# Patient Record
Sex: FEMALE | Race: WHITE | NOT HISPANIC OR LATINO | Employment: OTHER | URBAN - METROPOLITAN AREA
[De-identification: names, ages, dates, MRNs, and addresses within clinical notes are randomized per-mention and may not be internally consistent; named-entity substitution may affect disease eponyms.]

---

## 2019-08-22 PROBLEM — E78.5 HYPERLIPIDEMIA: Status: ACTIVE | Noted: 2019-06-11

## 2019-08-22 PROBLEM — M47.816 OSTEOARTHRITIS OF LUMBAR SPINE: Status: ACTIVE | Noted: 2019-06-11

## 2019-08-22 PROBLEM — K21.9 GASTROESOPHAGEAL REFLUX DISEASE: Status: ACTIVE | Noted: 2017-02-19

## 2019-08-22 PROBLEM — M45.9 ANKYLOSING SPONDYLITIS (HCC): Status: ACTIVE | Noted: 2019-08-22

## 2019-08-22 PROBLEM — M85.80 OSTEOPENIA: Status: ACTIVE | Noted: 2018-03-18

## 2019-08-22 PROBLEM — M25.559 HIP PAIN: Status: ACTIVE | Noted: 2019-08-22

## 2019-08-22 PROBLEM — D80.1 HYPOGAMMAGLOBULINEMIA (HCC): Status: ACTIVE | Noted: 2019-08-22

## 2019-08-22 PROBLEM — R76.8 HIGH TOTAL SERUM IGM: Status: ACTIVE | Noted: 2019-08-22

## 2019-08-22 PROBLEM — Z86.018 HX OF LIPOMA: Status: ACTIVE | Noted: 2019-08-22

## 2019-08-22 PROBLEM — D83.9 CVID (COMMON VARIABLE IMMUNODEFICIENCY) (HCC): Status: ACTIVE | Noted: 2018-02-02

## 2019-08-22 PROBLEM — J30.2 SEASONAL ALLERGIES: Status: ACTIVE | Noted: 2019-08-22

## 2019-10-04 ENCOUNTER — TRANSCRIBE ORDERS (OUTPATIENT)
Dept: ADMINISTRATIVE | Facility: HOSPITAL | Age: 64
End: 2019-10-04

## 2019-10-04 DIAGNOSIS — K29.70 GASTRITIS, PRESENCE OF BLEEDING UNSPECIFIED, UNSPECIFIED CHRONICITY, UNSPECIFIED GASTRITIS TYPE: ICD-10-CM

## 2019-10-04 DIAGNOSIS — B27.00 GAMMAHERPESVIRAL MONONUCLEOSIS WITHOUT COMPLICATION: ICD-10-CM

## 2019-10-04 DIAGNOSIS — D47.2 GAMMOPATHY, MONOCLONAL: Primary | ICD-10-CM

## 2019-10-17 ENCOUNTER — HOSPITAL ENCOUNTER (OUTPATIENT)
Dept: ULTRASOUND IMAGING | Facility: HOSPITAL | Age: 64
Discharge: HOME | End: 2019-10-17
Payer: MEDICARE

## 2019-10-17 DIAGNOSIS — K29.70 GASTRITIS, PRESENCE OF BLEEDING UNSPECIFIED, UNSPECIFIED CHRONICITY, UNSPECIFIED GASTRITIS TYPE: ICD-10-CM

## 2019-10-17 DIAGNOSIS — D47.2 GAMMOPATHY, MONOCLONAL: ICD-10-CM

## 2019-10-17 PROBLEM — R06.2 WHEEZING: Status: ACTIVE | Noted: 2019-10-17

## 2019-10-17 PROBLEM — J31.0 CHRONIC RHINITIS: Status: ACTIVE | Noted: 2019-10-17

## 2019-10-17 PROCEDURE — 76700 US EXAM ABDOM COMPLETE: CPT

## 2019-10-17 PROCEDURE — 76830 TRANSVAGINAL US NON-OB: CPT

## 2019-10-17 PROCEDURE — 76856 US EXAM PELVIC COMPLETE: CPT

## 2019-10-20 PROBLEM — D47.2 MONOCLONAL GAMMOPATHY: Status: ACTIVE | Noted: 2019-10-20

## 2019-11-11 PROBLEM — R22.42 MASS OF LEFT THIGH: Status: ACTIVE | Noted: 2019-11-11

## 2019-11-11 PROCEDURE — 88304 TISSUE EXAM BY PATHOLOGIST: CPT | Performed by: PATHOLOGY

## 2019-12-16 PROBLEM — J30.0 VASOMOTOR RHINITIS: Status: ACTIVE | Noted: 2019-12-16

## 2020-02-27 ENCOUNTER — TRANSCRIBE ORDERS (OUTPATIENT)
Dept: ADMINISTRATIVE | Facility: HOSPITAL | Age: 65
End: 2020-02-27

## 2020-03-03 PROBLEM — J32.2 ETHMOID SINUSITIS: Status: ACTIVE | Noted: 2020-03-03

## 2020-03-03 PROCEDURE — 87070 CULTURE OTHR SPECIMN AEROBIC: CPT | Performed by: PHYSICIAN ASSISTANT

## 2020-03-03 PROCEDURE — 87077 CULTURE AEROBIC IDENTIFY: CPT | Performed by: PHYSICIAN ASSISTANT

## 2020-03-03 PROCEDURE — 87205 SMEAR GRAM STAIN: CPT | Performed by: PHYSICIAN ASSISTANT

## 2020-03-03 PROCEDURE — 87102 FUNGUS ISOLATION CULTURE: CPT | Performed by: PHYSICIAN ASSISTANT

## 2020-03-03 PROCEDURE — 87184 SC STD DISK METHOD PER PLATE: CPT | Performed by: PHYSICIAN ASSISTANT

## 2020-04-09 PROBLEM — J33.9 NASAL POLYPS: Status: ACTIVE | Noted: 2020-04-09

## 2020-09-28 ENCOUNTER — TRANSCRIBE ORDERS (OUTPATIENT)
Dept: ADMINISTRATIVE | Facility: HOSPITAL | Age: 65
End: 2020-09-28

## 2020-09-28 DIAGNOSIS — M25.511 PAIN IN RIGHT SHOULDER: Primary | ICD-10-CM

## 2020-10-07 ENCOUNTER — HOSPITAL ENCOUNTER (OUTPATIENT)
Dept: MRI IMAGING | Facility: HOSPITAL | Age: 65
Discharge: HOME | End: 2020-10-07
Payer: MEDICARE

## 2020-10-07 DIAGNOSIS — M25.511 PAIN IN RIGHT SHOULDER: ICD-10-CM

## 2020-10-07 PROCEDURE — G1004 CDSM NDSC: HCPCS

## 2020-10-07 PROCEDURE — 73221 MRI JOINT UPR EXTREM W/O DYE: CPT

## 2021-02-22 ENCOUNTER — PREP FOR PROCEDURE (OUTPATIENT)
Dept: GASTROENTEROLOGY | Facility: CLINIC | Age: 66
End: 2021-02-22

## 2021-02-22 DIAGNOSIS — K21.9 GASTROESOPHAGEAL REFLUX DISEASE WITHOUT ESOPHAGITIS: Primary | ICD-10-CM

## 2021-03-01 RX ORDER — CYCLOBENZAPRINE HCL 5 MG
5 TABLET ORAL 3 TIMES DAILY PRN
COMMUNITY

## 2021-03-01 RX ORDER — OMEGA-3S/DHA/EPA/FISH OIL/D3 300MG-1000
400 CAPSULE ORAL DAILY
COMMUNITY

## 2021-03-01 RX ORDER — MAGNESIUM 30 MG
30 TABLET ORAL 2 TIMES DAILY
COMMUNITY

## 2021-03-01 RX ORDER — SECUKINUMAB 150 MG/ML
INJECTION SUBCUTANEOUS
COMMUNITY

## 2021-03-04 ENCOUNTER — ANESTHESIA EVENT (OUTPATIENT)
Dept: GASTROENTEROLOGY | Facility: AMBULATORY SURGERY CENTER | Age: 66
End: 2021-03-04

## 2021-03-04 ENCOUNTER — ANESTHESIA (OUTPATIENT)
Dept: GASTROENTEROLOGY | Facility: AMBULATORY SURGERY CENTER | Age: 66
End: 2021-03-04

## 2021-03-04 ENCOUNTER — HOSPITAL ENCOUNTER (OUTPATIENT)
Dept: GASTROENTEROLOGY | Facility: AMBULATORY SURGERY CENTER | Age: 66
Discharge: HOME | End: 2021-03-04
Payer: MEDICARE

## 2021-03-04 VITALS
DIASTOLIC BLOOD PRESSURE: 69 MMHG | WEIGHT: 147 LBS | BODY MASS INDEX: 25.1 KG/M2 | HEIGHT: 64 IN | OXYGEN SATURATION: 98 % | RESPIRATION RATE: 18 BRPM | SYSTOLIC BLOOD PRESSURE: 109 MMHG | TEMPERATURE: 96.4 F | HEART RATE: 78 BPM

## 2021-03-04 DIAGNOSIS — K21.9 GASTROESOPHAGEAL REFLUX DISEASE WITHOUT ESOPHAGITIS: ICD-10-CM

## 2021-03-04 PROCEDURE — 88305 TISSUE EXAM BY PATHOLOGIST: CPT | Performed by: PATHOLOGY

## 2021-03-04 PROCEDURE — 00731 ANES UPR GI NDSC PX NOS: CPT | Performed by: NURSE ANESTHETIST, CERTIFIED REGISTERED

## 2021-03-04 PROCEDURE — 43239 EGD BIOPSY SINGLE/MULTIPLE: CPT | Performed by: INTERNAL MEDICINE

## 2021-03-04 RX ORDER — SODIUM CHLORIDE 9 MG/ML
30 INJECTION, SOLUTION INTRAVENOUS CONTINUOUS
Status: DISCONTINUED | OUTPATIENT
Start: 2021-03-04 | End: 2021-03-08 | Stop reason: HOSPADM

## 2021-03-04 RX ORDER — SODIUM CHLORIDE 9 MG/ML
INJECTION, SOLUTION INTRAVENOUS CONTINUOUS PRN
Status: DISCONTINUED | OUTPATIENT
Start: 2021-03-04 | End: 2021-03-04

## 2021-03-04 RX ORDER — PANTOPRAZOLE SODIUM 40 MG/1
40 TABLET, DELAYED RELEASE ORAL DAILY
Qty: 30 TABLET | Refills: 1 | Status: SHIPPED | OUTPATIENT
Start: 2021-03-04

## 2021-03-04 RX ORDER — SODIUM CHLORIDE 9 MG/ML
20 INJECTION, SOLUTION INTRAVENOUS CONTINUOUS
Status: DISCONTINUED | OUTPATIENT
Start: 2021-03-04 | End: 2021-03-08 | Stop reason: HOSPADM

## 2021-03-04 RX ORDER — LIDOCAINE HYDROCHLORIDE 10 MG/ML
INJECTION, SOLUTION EPIDURAL; INFILTRATION; INTRACAUDAL; PERINEURAL AS NEEDED
Status: DISCONTINUED | OUTPATIENT
Start: 2021-03-04 | End: 2021-03-04

## 2021-03-04 RX ORDER — PROPOFOL 10 MG/ML
INJECTION, EMULSION INTRAVENOUS AS NEEDED
Status: DISCONTINUED | OUTPATIENT
Start: 2021-03-04 | End: 2021-03-04

## 2021-03-04 RX ADMIN — LIDOCAINE HYDROCHLORIDE 50 MG: 10 INJECTION, SOLUTION EPIDURAL; INFILTRATION; INTRACAUDAL; PERINEURAL at 11:20

## 2021-03-04 RX ADMIN — SODIUM CHLORIDE: 9 INJECTION, SOLUTION INTRAVENOUS at 10:38

## 2021-03-04 RX ADMIN — PROPOFOL 200 MG: 10 INJECTION, EMULSION INTRAVENOUS at 11:21

## 2021-03-04 NOTE — DISCHARGE INSTRUCTIONS
Gastroesophageal Reflux Disease   WHAT YOU NEED TO KNOW:   Gastroesophageal reflux disease (GERD) is reflux that occurs more than twice a week for a few weeks  Reflux means acid and food in the stomach back up into the esophagus  It usually causes heartburn and other symptoms  GERD can cause other health problems over time if it is not treated  DISCHARGE INSTRUCTIONS:   Call your local emergency number (911 in the [address] [facility]) if:   · You have severe chest pain and sudden trouble breathing  Seek care immediately if:   · You have trouble breathing after you vomit  · You have trouble swallowing, or pain with swallowing  · Your bowel movements are black, bloody, or tarry-looking  · Your vomit looks like coffee grounds or has blood in it  Call your doctor or gastroenterologist if:   · You feel full and cannot burp or vomit  · You vomit large amounts, or you vomit often  · You are losing weight without trying  · Your symptoms get worse or do not improve with treatment  · You have questions or concerns about your condition or care  Medicines:   · Medicines  are used to decrease stomach acid  Medicine may also be used to help your lower esophageal sphincter and stomach contract (tighten) more  · Take your medicine as directed  Contact your healthcare provider if you think your medicine is not helping or if you have side effects  Tell him or her if you are allergic to any medicine  Keep a list of the medicines, vitamins, and herbs you take  Include the amounts, and when and why you take them  Bring the list or the pill bottles to follow-up visits  Carry your medicine list with you in case of an emergency  Manage GERD:   · Do not have foods or drinks that may increase heartburn  These include chocolate, peppermint, fried or fatty foods, drinks that contain caffeine, or carbonated drinks (soda)  Other foods include spicy foods, onions, tomatoes, and tomato-based foods   Do not have foods or drinks that can irritate your esophagus, such as citrus fruits, juices, and alcohol  · Do not eat large meals  When you eat a lot of food at one time, your stomach needs more acid to digest it  Eat 6 small meals each day instead of 3 large ones, and eat slowly  Do not eat meals 2 to 3 hours before bedtime  · Elevate the head of your bed  Place 6-inch blocks under the head of your bed frame  You may also use more than one pillow under your head and shoulders while you sleep  · Maintain a healthy weight  If you are overweight, weight loss may help relieve symptoms of GERD  · Do not smoke  Smoking weakens the lower esophageal sphincter and increases the risk of GERD  Ask your healthcare provider for information if you currently smoke and need help to quit  E-cigarettes or smokeless tobacco still contain nicotine  Talk to your healthcare provider before you use these products  · Do not wear clothing that is tight around your waist   Tight clothing can put pressure on your stomach and cause or worsen GERD symptoms  Follow up with your doctor or gastroenterologist as directed:  Write down your questions so you remember to ask them during your visits  © Copyright [address] Information is for End User's use only and may not be sold, redistributed or otherwise used for commercial purposes  All illustrations and images included in CareNotes® are the copyrighted property of [employer]  or [facility] Sahra Desai  The above information is an  only  It is not intended as medical advice for individual conditions or treatments  Talk to your doctor, nurse or pharmacist before following any medical regimen to see if it is safe and effective for you

## 2021-03-04 NOTE — ANESTHESIA POSTPROCEDURE EVALUATION
Post-Op Assessment Note    CV Status:  Stable  Pain Score: 0    Pain management: adequate     Mental Status:  Alert   Hydration Status:  Stable   PONV Controlled:  None   Airway Patency:  Patent      Post Op Vitals Reviewed: Yes      Staff: CRNA   Comments: vss        No complications documented      /68 (03/04/21 1128)    Temp     Pulse 84 (03/04/21 1128)   Resp 18 (03/04/21 1128)    SpO2 100 % (03/04/21 1128)

## 2021-03-04 NOTE — ANESTHESIA PREPROCEDURE EVALUATION
Procedure:  EGD    Relevant Problems   CARDIO   (+) Hyperlipidemia      GI/HEPATIC   (+) Gastroesophageal reflux disease      MUSCULOSKELETAL   (+) Ankylosing spondylitis (HCC)   (+) Low back pain   (+) Osteoarthritis of lumbar spine      NEURO/PSYCH   (+) Hx of lipoma        Physical Exam    Airway    Mallampati score: I  TM Distance: >3 FB  Neck ROM: full     Dental       Cardiovascular  Rhythm: regular, Rate: normal,     Pulmonary  Breath sounds clear to auscultation,     Other Findings        Anesthesia Plan  ASA Score- 2     Anesthesia Type- IV sedation with anesthesia with ASA Monitors  Additional Monitors:   Airway Plan:           Plan Factors-Exercise tolerance (METS): >4 METS  Chart reviewed  EKG reviewed  Imaging results reviewed  Existing labs reviewed  Patient summary reviewed  Induction- intravenous  Postoperative Plan-     Informed Consent- Anesthetic plan and risks discussed with patient

## 2021-03-22 DIAGNOSIS — K83.8 COMMON BILE DUCT DILATATION: Primary | ICD-10-CM

## 2021-03-22 DIAGNOSIS — R93.89 ABNORMAL FINDING ON IMAGING: ICD-10-CM

## 2021-03-22 DIAGNOSIS — Z01.818 PRE-OP TESTING: ICD-10-CM

## 2021-03-29 ENCOUNTER — TRANSCRIBE ORDERS (OUTPATIENT)
Dept: ADMINISTRATIVE | Facility: HOSPITAL | Age: 66
End: 2021-03-29

## 2021-03-29 ENCOUNTER — TRANSCRIBE ORDERS (OUTPATIENT)
Dept: LAB | Facility: CLINIC | Age: 66
End: 2021-03-29

## 2021-03-29 ENCOUNTER — TRANSCRIBE ORDERS (OUTPATIENT)
Dept: RADIOLOGY | Facility: HOSPITAL | Age: 66
End: 2021-03-29

## 2021-03-29 ENCOUNTER — HOSPITAL ENCOUNTER (OUTPATIENT)
Dept: RADIOLOGY | Facility: HOSPITAL | Age: 66
Discharge: HOME | End: 2021-03-29
Payer: MEDICARE

## 2021-03-29 ENCOUNTER — APPOINTMENT (OUTPATIENT)
Dept: LAB | Facility: CLINIC | Age: 66
End: 2021-03-29
Payer: MEDICARE

## 2021-03-29 DIAGNOSIS — M54.6 PAIN IN THORACIC SPINE: Primary | ICD-10-CM

## 2021-03-29 DIAGNOSIS — Z01.818 PRE-OP TESTING: ICD-10-CM

## 2021-03-29 DIAGNOSIS — K83.8 COMMON BILE DUCT DILATATION: ICD-10-CM

## 2021-03-29 DIAGNOSIS — M45.0 ANKYLOSING SPONDYLITIS OF MULTIPLE SITES IN SPINE (HCC): Primary | ICD-10-CM

## 2021-03-29 DIAGNOSIS — R93.89 ABNORMAL FINDING ON IMAGING: ICD-10-CM

## 2021-03-29 DIAGNOSIS — M54.6 PAIN IN THORACIC SPINE: ICD-10-CM

## 2021-03-29 DIAGNOSIS — M45.0 ANKYLOSING SPONDYLITIS OF MULTIPLE SITES IN SPINE (HCC): ICD-10-CM

## 2021-03-29 LAB
ALBUMIN SERPL BCP-MCNC: 3.6 G/DL (ref 3.5–5)
ALP SERPL-CCNC: 87 U/L (ref 46–116)
ALT SERPL W P-5'-P-CCNC: 47 U/L (ref 12–78)
ANION GAP SERPL CALCULATED.3IONS-SCNC: 7 MMOL/L (ref 4–13)
AST SERPL W P-5'-P-CCNC: 25 U/L (ref 5–45)
BILIRUB SERPL-MCNC: 0.26 MG/DL (ref 0.2–1)
BUN SERPL-MCNC: 13 MG/DL (ref 5–25)
CALCIUM SERPL-MCNC: 8.9 MG/DL (ref 8.3–10.1)
CHLORIDE SERPL-SCNC: 106 MMOL/L (ref 100–108)
CO2 SERPL-SCNC: 29 MMOL/L (ref 21–32)
CREAT SERPL-MCNC: 0.7 MG/DL (ref 0.6–1.3)
CRP SERPL QL: <3 MG/L
ERYTHROCYTE [SEDIMENTATION RATE] IN BLOOD: 16 MM/HOUR (ref 0–29)
GFR SERPL CREATININE-BSD FRML MDRD: 90 ML/MIN/1.73SQ M
GLUCOSE SERPL-MCNC: 94 MG/DL (ref 65–140)
POTASSIUM SERPL-SCNC: 4.1 MMOL/L (ref 3.5–5.3)
PROT SERPL-MCNC: 7 G/DL (ref 6.4–8.2)
SODIUM SERPL-SCNC: 142 MMOL/L (ref 136–145)

## 2021-03-29 PROCEDURE — 80053 COMPREHEN METABOLIC PANEL: CPT

## 2021-03-29 PROCEDURE — 85652 RBC SED RATE AUTOMATED: CPT

## 2021-03-29 PROCEDURE — 86140 C-REACTIVE PROTEIN: CPT

## 2021-03-29 PROCEDURE — 36415 COLL VENOUS BLD VENIPUNCTURE: CPT

## 2021-03-29 PROCEDURE — 72072 X-RAY EXAM THORAC SPINE 3VWS: CPT

## 2021-04-11 ENCOUNTER — HOSPITAL ENCOUNTER (OUTPATIENT)
Dept: MRI IMAGING | Facility: HOSPITAL | Age: 66
Discharge: HOME | End: 2021-04-11
Attending: INTERNAL MEDICINE
Payer: MEDICARE

## 2021-04-11 DIAGNOSIS — K83.8 COMMON BILE DUCT DILATATION: ICD-10-CM

## 2021-04-11 DIAGNOSIS — R93.89 ABNORMAL FINDING ON IMAGING: ICD-10-CM

## 2021-04-11 PROCEDURE — A9585 GADOBUTROL INJECTION: HCPCS | Performed by: INTERNAL MEDICINE

## 2021-04-11 PROCEDURE — 74183 MRI ABD W/O CNTR FLWD CNTR: CPT

## 2021-04-11 PROCEDURE — G1004 CDSM NDSC: HCPCS

## 2021-04-11 PROCEDURE — 76377 3D RENDER W/INTRP POSTPROCES: CPT

## 2021-04-11 RX ADMIN — GADOBUTROL 6 ML: 604.72 INJECTION INTRAVENOUS at 16:51

## 2021-04-15 ENCOUNTER — TELEPHONE (OUTPATIENT)
Dept: MRI IMAGING | Facility: HOSPITAL | Age: 66
End: 2021-04-15

## 2021-04-16 ENCOUNTER — OFFICE VISIT (OUTPATIENT)
Dept: GASTROENTEROLOGY | Facility: CLINIC | Age: 66
End: 2021-04-16
Payer: MEDICARE

## 2021-04-16 VITALS
HEART RATE: 86 BPM | WEIGHT: 150 LBS | BODY MASS INDEX: 25.61 KG/M2 | HEIGHT: 64 IN | SYSTOLIC BLOOD PRESSURE: 113 MMHG | DIASTOLIC BLOOD PRESSURE: 73 MMHG

## 2021-04-16 DIAGNOSIS — D12.6 COLON ADENOMA: ICD-10-CM

## 2021-04-16 DIAGNOSIS — K21.9 GASTROESOPHAGEAL REFLUX DISEASE, UNSPECIFIED WHETHER ESOPHAGITIS PRESENT: Primary | ICD-10-CM

## 2021-04-16 DIAGNOSIS — K83.8 DILATED CBD, ACQUIRED: ICD-10-CM

## 2021-04-16 PROCEDURE — 99214 OFFICE O/P EST MOD 30 MIN: CPT | Performed by: INTERNAL MEDICINE

## 2021-04-16 PROCEDURE — 1123F ACP DISCUSS/DSCN MKR DOCD: CPT | Performed by: INTERNAL MEDICINE

## 2021-04-16 NOTE — PROGRESS NOTES
[facility] - Outpatient Follow-up Note  Kady Mercado June Juan Manuel Monarch 78 y o  female MRN: [id number]  Encounter: [id number]          ASSESSMENT AND PLAN:      1  Gastroesophageal reflux disease, unspecified whether esophagitis present   continue Protonix every other day for the next month or so and then she will go off and take as needed  Patient can resume if symptoms recur  Fundoplication wrap was intact with no recurrence of hiatal hernia on recent EGD    2  Colon adenoma   colonoscopy is due in 2023 for history of colon polyps and she does give family history of in "bowel cancer'    3  Dilated CBD   patient with slightly dilated CBD on ultrasound and we are awaiting MRCP results which are still pending  No evidence of gallstones noted on ultrasound and LFTs were normal     We will see patient in 3 months for follow-up and patient was seen examined with Dr Kyle Jaimes  ______________________________________________________________________    SUBJECTIVE:     this is a 48-year-old female who presents today for follow-up to EGD as well as MRI MRCP  Patient had ultrasound done  At [facility] which showed a slightly dilated common bile duct at 7 mm  LFTs were done yesterday which were normal   Patient describes a slight stomach ache which is about a 1/10 when it seems to be a burning  She states that she is taking pantoprazole every other day  She states that she is having more reflux in the recent past that she has gained about 25 lb  Recent EGD with biopsy showed intact  Fundoplication wrap with minimal reflux esophagitis and mild erythema of the antrum but biopsies were negative  Colonoscopy  Is due in January of 2023 for history of polyps  REVIEW OF SYSTEMS IS OTHERWISE NEGATIVE        Historical Information   Past Medical History:   Diagnosis Date    Ankylosing spondylitis ([address] )     Cataract     Deviated septum     Fever and chills     Gastritis     w/ ulcer    GERD (gastroesophageal reflux disease)     Hives     after hyqvia    Parathyroid abnormality (HCC)     nodules    SVT (supraventricular tachycardia) (HCC)     PRN    Weight loss      Past Surgical History:   Procedure Laterality Date    BILATERAL OOPHORECTOMY      CATARACT EXTRACTION, BILATERAL      COLONOSCOPY      EGD      HAND TENDON SURGERY      L/R wrist     HYSTERECTOMY      with bso    SINUS SURGERY      WISDOM TOOTH EXTRACTION       Social History   Social History     Substance and Sexual Activity   Alcohol Use Yes    Frequency: Monthly or less    Drinks per session: 1 or 2    Binge frequency: Never    Comment: socially      Social History     Substance and Sexual Activity   Drug Use Never     Social History     Tobacco Use   Smoking Status Former Smoker    Packs/day: 0 50    Years: 5 00    Pack years: 2 50    Types: Cigarettes    Quit date: 35    Years since quittin 3   Smokeless Tobacco Never Used     Family History   Problem Relation Age of Onset    Alzheimer's disease Mother     Hypertension Mother     COPD Mother     Hyperlipidemia Mother     Seizures Mother         eplepsy     Arthritis Father         cervical     Obesity Sister     Hypertension Sister     Other Sister         wheezing     Hyperlipidemia Sister     Hypertension Brother     Hyperlipidemia Brother     Psychiatric Illness Brother     Other Brother         boating accident     Other Brother         boating accident     Allergies Son     No Known Problems Daughter     No Known Problems Daughter        Meds/Allergies       Current Outpatient Medications:     acetaminophen (TYLENOL) 500 mg tablet    Biotin 5 MG CAPS    budesonide (PULMICORT) 0 25 mg/2 mL nebulizer solution    calcium carbonate (OS-COLBY) 350 MG tablet    Certolizumab Pegol (CIMZIA SC)    cholecalciferol (VITAMIN D3) 400 units tablet    cyclobenzaprine (FLEXERIL) 5 mg tablet    Fluticasone Propionate (Xhance) 93 MCG/ACT EXHU   levocetirizine (XYZAL) 5 MG tablet    magnesium 30 MG tablet    Multiple Vitamin (MULTI-VITAMIN DAILY PO)    mupirocin (BACTROBAN) 2 % nasal ointment    pantoprazole (PROTONIX) 40 mg tablet    predniSONE 10 mg tablet    Secukinumab (Cosentyx) 150 MG/ML SOSY    valACYclovir (VALTREX) 500 mg tablet    verapamil (VERELAN PM) 120 MG 24 hr capsule    zinc gluconate 50 mg tablet    No Known Allergies        Objective     Blood pressure 113/73, pulse 86, height 5' 4" (1 626 m), weight 68 kg (150 lb)  Body mass index is 25 75 kg/m²  PHYSICAL EXAM:      General Appearance:   Alert, cooperative, no distress   HEENT:   Normocephalic, atraumatic, anicteric      Neck:  Supple, symmetrical, trachea midline   Lungs:   Clear to auscultation bilaterally; no rales, rhonchi or wheezing; respirations unlabored    Heart[de-identified]   Regular rate and rhythm; no murmur, rub, or gallop  Abdomen:   Soft, non-tender, non-distended; normal bowel sounds; no masses, no organomegaly    Genitalia:   Deferred    Rectal:   Deferred    Extremities:  No cyanosis, clubbing or edema    Pulses:  2+ and symmetric    Skin:  No jaundice, rashes, or lesions    Lymph nodes:  No palpable cervical lymphadenopathy        Lab Results:   No visits with results within 1 Day(s) from this visit     Latest known visit with results is:   Appointment on 03/29/2021   Component Date Value    Sodium 03/29/2021 142     Potassium 03/29/2021 4 1     Chloride 03/29/2021 106     CO2 03/29/2021 29     ANION GAP 03/29/2021 7     BUN 03/29/2021 13     Creatinine 03/29/2021 0 70     Glucose 03/29/2021 94     Calcium 03/29/2021 8 9     AST 03/29/2021 25     ALT 03/29/2021 47     Alkaline Phosphatase 03/29/2021 87     Total Protein 03/29/2021 7 0     Albumin 03/29/2021 3 6     Total Bilirubin 03/29/2021 0 26     eGFR 03/29/2021 90     CRP 03/29/2021 <3 0     Sed Rate 03/29/2021 16          Radiology Results:   Xr Spine Thoracic 3 Vw    Result Date: 4/3/2021  Narrative: THORACIC SPINE INDICATION:   M54 6: Pain in thoracic spine  Mid back pain beginning last week  COMPARISON:  None VIEWS:  XR SPINE THORACIC 3 VW FINDINGS: There is no fracture or pathologic bone lesion  Patient has S-shaped thoracic scoliosis with the superior curve centered at the T3 level  The inferior aspect is convex to the right centered at T9-T10   degenerative changes are noted at the T9-T10 level  There is no displacement of the paraspinal line  The pedicles appear intact  Impression: No acute osseous abnormality within the thoracic spine  Degenerative changes T9-T10 level Workstation performed:       EGD-  IMPRESSION:  1  Status post transoral incision less fundoplication, fundoplication wrap appeared intact, no recurrence of hiatal hernia  2  Minimal reflux esophagitis  3  Mild erythema in the antrum  4  Normal duodenum     RECOMMENDATION:  1  Anti-reflux diet  2  Follow-up biopsies  3  Protonix 40 mg as needed    Biopsies-   A  Stomach, cold bx antrum check for hpy:       - Antral mucosa with chronic inactive gastritis  - No Helicobacter pylori organisms are identified on the routine stain        - No intestinal metaplasia, dysplasia or malignancy is identified      B  Esophagus, cold bx lower esophagus reflux:       - Squamous mucosa with fewer than 2 eosinophils per high power field  - Benign glandular mucosa  - No intestinal metaplasia, dysplasia or malignancy is identified

## 2021-04-18 ENCOUNTER — HOSPITAL ENCOUNTER (OUTPATIENT)
Dept: MRI IMAGING | Facility: HOSPITAL | Age: 66
Discharge: HOME | End: 2021-04-18
Payer: MEDICARE

## 2021-04-18 DIAGNOSIS — M54.6 PAIN IN THORACIC SPINE: ICD-10-CM

## 2021-04-18 PROCEDURE — 72146 MRI CHEST SPINE W/O DYE: CPT

## 2021-04-18 PROCEDURE — G1004 CDSM NDSC: HCPCS

## 2021-04-27 ENCOUNTER — HOSPITAL ENCOUNTER (OUTPATIENT)
Dept: MRI IMAGING | Facility: HOSPITAL | Age: 66
Discharge: HOME | End: 2021-04-27
Payer: MEDICARE

## 2021-04-27 DIAGNOSIS — H93.13 NEW ONSET TINNITUS OF BOTH EARS: ICD-10-CM

## 2021-04-27 DIAGNOSIS — H93.13 TINNITUS, BILATERAL: ICD-10-CM

## 2021-04-27 DIAGNOSIS — H91.93 HIGH FREQUENCY HEARING LOSS OF BOTH EARS: ICD-10-CM

## 2021-04-27 PROCEDURE — G1004 CDSM NDSC: HCPCS

## 2021-04-27 PROCEDURE — 70553 MRI BRAIN STEM W/O & W/DYE: CPT

## 2021-04-27 PROCEDURE — A9585 GADOBUTROL INJECTION: HCPCS | Performed by: PHYSICIAN ASSISTANT

## 2021-04-27 RX ADMIN — GADOBUTROL 6 ML: 604.72 INJECTION INTRAVENOUS at 08:34

## 2021-05-13 ENCOUNTER — HOSPITAL ENCOUNTER (OUTPATIENT)
Dept: RADIOLOGY | Facility: HOSPITAL | Age: 66
Discharge: HOME | End: 2021-05-13
Payer: MEDICARE

## 2021-05-13 ENCOUNTER — TRANSCRIBE ORDERS (OUTPATIENT)
Dept: ADMINISTRATIVE | Facility: HOSPITAL | Age: 66
End: 2021-05-13

## 2021-05-13 DIAGNOSIS — M54.2 NECK PAIN: Primary | ICD-10-CM

## 2021-05-13 DIAGNOSIS — M54.2 NECK PAIN: ICD-10-CM

## 2021-05-13 PROCEDURE — 72052 X-RAY EXAM NECK SPINE 6/>VWS: CPT

## 2021-05-24 ENCOUNTER — TELEPHONE (OUTPATIENT)
Dept: GASTROENTEROLOGY | Facility: CLINIC | Age: 66
End: 2021-05-24

## 2021-05-24 NOTE — TELEPHONE ENCOUNTER
Returned Pt call, she has cxld her 7/23/21 appt  Because she stopped drinking soda and has no more stomach issues  She wanted you to know you were right!   Thank you

## 2022-11-28 PROBLEM — J33.9 NASAL POLYPS: Status: RESOLVED | Noted: 2020-04-09 | Resolved: 2022-11-28

## 2022-11-28 PROBLEM — M47.816 OSTEOARTHRITIS OF LUMBAR SPINE: Status: RESOLVED | Noted: 2019-06-11 | Resolved: 2022-11-28

## 2022-11-28 PROBLEM — R06.2 WHEEZING: Status: RESOLVED | Noted: 2019-10-17 | Resolved: 2022-11-28

## 2022-12-27 ENCOUNTER — TELEPHONE (OUTPATIENT)
Dept: GASTROENTEROLOGY | Facility: CLINIC | Age: 67
End: 2022-12-27

## 2022-12-27 NOTE — TELEPHONE ENCOUNTER
Recall ltr for colon with Dr Kerry Spaulding due to hx of adenoma polyps , fam hx of bowel ca  Due 1/18/2023

## 2023-01-06 NOTE — TELEPHONE ENCOUNTER
01/06/23  Screened by: Kobe Ploanco MA    Referring Provider RECALL/ OA      FAILED OA    Pre- Screening: There is no height or weight on file to calculate BMI  Has patient been referred for a routine screening Colonoscopy? yes  Is the patient between 39-70 years old? yes      Previous Colonoscopy yes   If yes:    Date:     Facility:     Reason:       SCHEDULING STAFF: If the patient is between 39yrs-47yrs, please advise patient to confirm benefits/coverage with their insurance company for a routine screening colonoscopy, some insurance carriers will only cover at [address] or older  If the patient is over 66years old, please schedule an office visit  Does the patient want to see a Gastroenterologist prior to their procedure OR are they having any GI symptoms? no    Has the patient been hospitalized or had abdominal surgery in the past 6 months? yes    Does the patient use supplemental oxygen? no    Does the patient take Coumadin, Lovenox, Plavix, Elliquis, Xarelto, or other blood thinning medication? no    Has the patient had a stroke, cardiac event, or stent placed in the past year? no    SCHEDULING STAFF: If patient answers NO to above questions, then schedule procedure  If patient answers YES to above questions, then schedule office appointment  If patient is between 45yrs - 49yrs, please advise patient that we will have to confirm benefits & coverage with their insurance company for a routine screening colonoscopy

## 2023-01-17 ENCOUNTER — OFFICE VISIT (OUTPATIENT)
Dept: GASTROENTEROLOGY | Facility: CLINIC | Age: 68
End: 2023-01-17

## 2023-01-17 ENCOUNTER — TELEPHONE (OUTPATIENT)
Dept: GASTROENTEROLOGY | Facility: CLINIC | Age: 68
End: 2023-01-17

## 2023-01-17 VITALS
HEIGHT: 65 IN | WEIGHT: 131 LBS | SYSTOLIC BLOOD PRESSURE: 131 MMHG | DIASTOLIC BLOOD PRESSURE: 86 MMHG | BODY MASS INDEX: 21.83 KG/M2 | HEART RATE: 83 BPM

## 2023-01-17 DIAGNOSIS — K21.9 GASTROESOPHAGEAL REFLUX DISEASE, UNSPECIFIED WHETHER ESOPHAGITIS PRESENT: ICD-10-CM

## 2023-01-17 DIAGNOSIS — K83.8 DILATED CBD, ACQUIRED: ICD-10-CM

## 2023-01-17 DIAGNOSIS — D12.6 COLON ADENOMA: Primary | ICD-10-CM

## 2023-01-17 RX ORDER — MIRABEGRON 50 MG/1
TABLET, FILM COATED, EXTENDED RELEASE ORAL
COMMUNITY
Start: 2022-01-24

## 2023-01-17 NOTE — TELEPHONE ENCOUNTER
Scheduled date of colonoscopy (as of today):1/24/23  Physician performing colonoscopy: Mora Dillon  Location of colonoscopy: [facility]  Bowel prep reviewed with patient: Sebas / gigi  Instructions reviewed with patient by:Jeanna MENDEZ  Clearances: None

## 2023-01-17 NOTE — PROGRESS NOTES
[facility] - Outpatient Follow-up Note  Nima Jansen 75 y o  female MRN: [id number]  Encounter: [id number]          ASSESSMENT AND PLAN:      1  Colon adenoma  Due for screening colonoscopy for history of colon polyps and she does give family history of in "bowel cancer' in 4 of her mother's sisters  Colonoscopy ordered  Pt would like to try Miralax/dulcolax prep due to issues tolerating golytely in the past   -     Colonoscopy; Future; Expected date: 01/17/2023    2  Dilated cbd, acquired  CBD mildly dilated 8mm on MRI/MRCP 4/2021 w/ no evidence choledocholithiasis or worrisome findings of abrupt cutoff, shouldering of the common bile duct suggest biliary obstruction  LFTs normal      3  Gastroesophageal reflux disease, unspecified whether esophagitis present   S/p TIF with fundoplication wrap  intact with no recurrence of hiatal hernia on EGD in March 2021  She successfully weaned off Pantoprazole  Now only has rare heartburn if she overeats   -Anti-reflux diet, lifestyle measures   -Continue acid suppression PRN        ______________________________________________________________________    SUBJECTIVE:  Nima Jansen is a 75 y o  female with history of ankylosing spondylitis on Cosentyx, GERD s/p TIF, hysterectomy, and recent R shoulder surgery 12/8 who presents for follow up to schedule colonoscopy  Has been in the hospital 2 times since last seen last year- she had sepsis related to an underarm infection then in September she was hospitalized with COVID/hyponatremia and was given Paxlovid which she reports wrecked her stomach  Stomach is back to normal now  Rare reflux symptoms usually if overeats  Denies any dysphagia, abdominal pain, nausea/vomiting  Denies any change in bowel habits, constipation, diarrhea, blood in stool, unintended weight loss  Reports history of "bowel cancer" in 4 of mother's sisters  REVIEW OF SYSTEMS IS OTHERWISE NEGATIVE        Historical Information   Past Medical History:   Diagnosis Date   • [address]yl[address]yl[address] ([facility] [address] )    • Cataract    • Colon polyp 35   • Deviated septum    • Fever and chills    • Gastric ulcer    • Gastritis     w/ ulcer   • GERD (gastroesophageal reflux disease)    • Hives     after hyqvia   • Lactose intolerance    • Parathyroid abnormality (HCC)     nodules   • SVT (supraventricular tachycardia) (HCC)     PRN   • Weight loss      Past Surgical History:   Procedure Laterality Date   • BILATERAL OOPHORECTOMY     • CATARACT EXTRACTION, BILATERAL     • COLONOSCOPY     • COLONOSCOPY     • EGD     • HAND TENDON SURGERY      L/R wrist    • HEMORRHOID SURGERY  3 times   • HYSTERECTOMY      with bso   • SINUS SURGERY     • TUBAL LIGATION     • UPPER GASTROINTESTINAL ENDOSCOPY     • WISDOM TOOTH EXTRACTION       Social History   Social History     Substance and Sexual Activity   Alcohol Use Yes   • Alcohol/week: 1 0 standard drink   • Types: 1 Glasses of wine per week    Comment: Rarely     Social History     Substance and Sexual Activity   Drug Use Never     Social History     Tobacco Use   Smoking Status Former   • Packs/day: 0 50   • Years: 5 00   • Pack years: 2 50   • Types: Cigarettes   • Quit date: 1980   • Years since quittin 0   Smokeless Tobacco Never     Family History   Problem Relation Age of Onset   • Alzheimer's disease Mother    • Hypertension Mother    • COPD Mother    • Hyperlipidemia Mother    • Seizures Mother         eplepsy    • Hypothyroidism Mother    • Stroke Mother    • Arthritis Father         cervical    • Obesity Sister    • Hypertension Sister    • Other Sister         wheezing    • Hyperlipidemia Sister    • Hypertension Brother    • Hyperlipidemia Brother    • Psychiatric Illness Brother    • Other Brother         boating accident    • Other Brother         boating accident    • Allergies Son    • No Known Problems Daughter    • No Known Problems Daughter        Meds/Allergies Current Outpatient Medications:   •  acetaminophen (TYLENOL) 500 mg tablet  •  calcium carbonate (OS-COLBY) 600 MG tablet  •  levocetirizine (XYZAL) 5 MG tablet  •  Magnesium 250 MG TABS  •  Mirabegron ER (Myrbetriq) 50 MG TB24  •  Secukinumab (Cosentyx) 150 MG/ML SOSY  •  valACYclovir (VALTREX) 500 mg tablet  •  verapamil (VERELAN PM) 120 MG 24 hr capsule  •  zinc gluconate 50 mg tablet    No Known Allergies        Objective     Blood pressure 131/86, pulse 83, height 5' 5" (1 651 m), weight 59 4 kg (131 lb)  Body mass index is 21 8 kg/m²  PHYSICAL EXAM:      General Appearance:   Alert, cooperative, no distress   HEENT:   Normocephalic, atraumatic, anicteric  Neck:  Supple, symmetrical, trachea midline   Lungs:   Clear to auscultation bilaterally; no rales, rhonchi or wheezing; respirations unlabored    Heart[de-identified]   Regular rate and rhythm; no murmur  Abdomen:   Soft, non-tender, non-distended; normal bowel sounds; no masses, no organomegaly    Genitalia:   Deferred    Rectal:   Deferred    Extremities:  No cyanosis, clubbing or edema    Skin:  No jaundice, rashes, or lesions    Lymph nodes:  No palpable cervical lymphadenopathy        Lab Results:   No visits with results within 1 Day(s) from this visit  Latest known visit with results is:   Appointment on 03/29/2021   Component Date Value   • Sodium 03/29/2021 142    • Potassium 03/29/2021 4 1    • Chloride 03/29/2021 106    • CO2 03/29/2021 29    • ANION GAP 03/29/2021 7    • BUN 03/29/2021 13    • Creatinine 03/29/2021 0 70    • Glucose 03/29/2021 94    • Calcium 03/29/2021 8 9    • AST 03/29/2021 25    • ALT 03/29/2021 47    • Alkaline Phosphatase 03/29/2021 87    • Total Protein 03/29/2021 7 0    • Albumin 03/29/2021 3 6    • Total Bilirubin 03/29/2021 0 26    • eGFR 03/29/2021 90    • CRP 03/29/2021 <3 0    • Sed Rate 03/29/2021 16          Radiology Results:   No results found

## 2023-02-24 ENCOUNTER — ANESTHESIA (OUTPATIENT)
Dept: GASTROENTEROLOGY | Facility: AMBULATORY SURGERY CENTER | Age: 68
End: 2023-02-24

## 2023-02-24 ENCOUNTER — HOSPITAL ENCOUNTER (OUTPATIENT)
Dept: GASTROENTEROLOGY | Facility: AMBULATORY SURGERY CENTER | Age: 68
Discharge: HOME | End: 2023-02-24

## 2023-02-24 ENCOUNTER — ANESTHESIA EVENT (OUTPATIENT)
Dept: GASTROENTEROLOGY | Facility: AMBULATORY SURGERY CENTER | Age: 68
End: 2023-02-24

## 2023-02-24 VITALS
HEIGHT: 65 IN | DIASTOLIC BLOOD PRESSURE: 72 MMHG | OXYGEN SATURATION: 98 % | RESPIRATION RATE: 18 BRPM | WEIGHT: 129 LBS | SYSTOLIC BLOOD PRESSURE: 118 MMHG | HEART RATE: 84 BPM | TEMPERATURE: 97 F | BODY MASS INDEX: 21.49 KG/M2

## 2023-02-24 DIAGNOSIS — D12.6 COLON ADENOMA: ICD-10-CM

## 2023-02-24 RX ORDER — SODIUM CHLORIDE 9 MG/ML
125 INJECTION, SOLUTION INTRAVENOUS CONTINUOUS
Status: DISCONTINUED | OUTPATIENT
Start: 2023-02-24 | End: 2023-02-28 | Stop reason: HOSPADM

## 2023-02-24 RX ORDER — PROPOFOL 10 MG/ML
INJECTION, EMULSION INTRAVENOUS AS NEEDED
Status: DISCONTINUED | OUTPATIENT
Start: 2023-02-24 | End: 2023-02-24

## 2023-02-24 RX ORDER — SODIUM CHLORIDE 9 MG/ML
20 INJECTION, SOLUTION INTRAVENOUS CONTINUOUS
Status: DISCONTINUED | OUTPATIENT
Start: 2023-02-24 | End: 2023-02-28 | Stop reason: HOSPADM

## 2023-02-24 RX ORDER — SODIUM CHLORIDE 9 MG/ML
INJECTION, SOLUTION INTRAVENOUS CONTINUOUS PRN
Status: DISCONTINUED | OUTPATIENT
Start: 2023-02-24 | End: 2023-02-24

## 2023-02-24 RX ADMIN — PROPOFOL 30 MG: 10 INJECTION, EMULSION INTRAVENOUS at 10:41

## 2023-02-24 RX ADMIN — PROPOFOL 20 MG: 10 INJECTION, EMULSION INTRAVENOUS at 10:30

## 2023-02-24 RX ADMIN — PROPOFOL 20 MG: 10 INJECTION, EMULSION INTRAVENOUS at 10:29

## 2023-02-24 RX ADMIN — PROPOFOL 20 MG: 10 INJECTION, EMULSION INTRAVENOUS at 10:28

## 2023-02-24 RX ADMIN — PROPOFOL 30 MG: 10 INJECTION, EMULSION INTRAVENOUS at 10:33

## 2023-02-24 RX ADMIN — PROPOFOL 110 MG: 10 INJECTION, EMULSION INTRAVENOUS at 10:27

## 2023-02-24 RX ADMIN — SODIUM CHLORIDE: 9 INJECTION, SOLUTION INTRAVENOUS at 10:23

## 2023-02-24 RX ADMIN — PROPOFOL 20 MG: 10 INJECTION, EMULSION INTRAVENOUS at 10:37

## 2023-02-24 NOTE — H&P
History and Physical - [facility]  Nathalia Asif 70 y o  female MRN: [id number]                  HPI: Feng Yoder is a 70y o  year old female who presents for colonoscopy, history of colon polyps      REVIEW OF SYSTEMS: Per the HPI, and otherwise unremarkable      Historical Information   Past Medical History:   Diagnosis Date   • [address]ylosing spondylitis ([facility] [address] )    • Cataract    • Colon polyp 36   • Deviated septum    • Fever and chills    • Gastric ulcer    • Gastritis     w/ ulcer   • GERD (gastroesophageal reflux disease)    • Hives     after hyqvia   • Lactose intolerance    • Parathyroid abnormality (HCC)     nodules   • Seasonal allergies    • SVT (supraventricular tachycardia) (HCC)     PRN   • Weight loss      Past Surgical History:   Procedure Laterality Date   • BILATERAL OOPHORECTOMY     • CATARACT EXTRACTION, BILATERAL     • COLONOSCOPY     • COLONOSCOPY     • EGD     • HAND TENDON SURGERY      L/R wrist    • HEMORRHOID SURGERY  3 times   • HYSTERECTOMY      with bso   • SHOULDER SURGERY Right    • SINUS SURGERY     • TONSILLECTOMY     • TUBAL LIGATION     • UPPER GASTROINTESTINAL ENDOSCOPY     • WISDOM TOOTH EXTRACTION       Social History   Social History     Substance and Sexual Activity   Alcohol Use Yes   • Alcohol/week: 1 0 standard drink   • Types: 1 Glasses of wine per week    Comment: Rarely     Social History     Substance and Sexual Activity   Drug Use Never     Social History     Tobacco Use   Smoking Status Former   • Packs/day: 0 50   • Years: 5 00   • Pack years: 2 50   • Types: Cigarettes   • Quit date: 1980   • Years since quittin 1   Smokeless Tobacco Never     Family History   Problem Relation Age of Onset   • Alzheimer's disease Mother    • Hypertension Mother    • COPD Mother    • Hyperlipidemia Mother    • Seizures Mother         eplepsy    • Hypothyroidism Mother    • Stroke Mother    • Arthritis Father         cervical    • Obesity Sister    • Hypertension Sister    • Other Sister         wheezing    • Hyperlipidemia Sister    • Hypertension Brother    • Hyperlipidemia Brother    • Psychiatric Illness Brother    • Other Brother         boating accident    • Other Brother         boating accident    • No Known Problems Daughter    • No Known Problems Daughter    • Allergies Son    • Colon cancer Maternal Grandmother    • Colon cancer Maternal Aunt    • Colon cancer Paternal Uncle        Meds/Allergies     (Not in a hospital admission)      No Known Allergies    Objective     /58   Pulse 96   Temp (!) 97 °F (36 1 °C) (Temporal)   Resp 18   Ht 5' 5" (1 651 m)   Wt 58 5 kg (129 lb)   SpO2 98%   BMI 21 47 kg/m²       PHYSICAL EXAM    Gen: NAD  CV: RRR  CHEST: Clear  ABD: soft, NT/ND  EXT: no edema      ASSESSMENT/PLAN:  This is a 70y o  year old female here for surveillance colonoscopy, and she is stable and optimized for her procedure

## 2023-02-24 NOTE — ANESTHESIA POSTPROCEDURE EVALUATION
Post-Op Assessment Note    CV Status:  Stable    Pain management: adequate     Mental Status:  Sleepy   Hydration Status:  Euvolemic   PONV Controlled:  Controlled   Airway Patency:  Patent      Post Op Vitals Reviewed: Yes      Staff: Anesthesiologist, CRNA         No notable events documented      BP   137/63   Temp     Pulse  107   Resp   18   SpO2   99

## 2023-02-24 NOTE — ANESTHESIA PREPROCEDURE EVALUATION
Procedure:  COLONOSCOPY    Relevant Problems   CARDIO   (+) Hyperlipidemia      GI/HEPATIC   (+) Gastroesophageal reflux disease      MUSCULOSKELETAL   (+) Ankylosing spondylitis (HCC)   (+) Low back pain      NEURO/PSYCH   (+) Hx of lipoma        Physical Exam    Airway    Mallampati score: II  TM Distance: >3 FB  Neck ROM: full     Dental   No notable dental hx     Cardiovascular  Cardiovascular exam normal    Pulmonary  Pulmonary exam normal     Other Findings        Anesthesia Plan  ASA Score- 2     Anesthesia Type- IV sedation with anesthesia with ASA Monitors  Additional Monitors:   Airway Plan:           Plan Factors-Exercise tolerance (METS): >4 METS  Chart reviewed  Imaging results reviewed  Existing labs reviewed  Patient summary reviewed  Patient is not a current smoker  Induction-     Postoperative Plan-     Informed Consent- Anesthetic plan and risks discussed with patient  I personally reviewed this patient with the CRNA  Discussed and agreed on the Anesthesia Plan with the CRNA  Rupesh Rouse

## 2023-02-27 ENCOUNTER — TELEPHONE (OUTPATIENT)
Dept: OTHER | Facility: OTHER | Age: 68
End: 2023-02-27

## 2023-02-27 NOTE — TELEPHONE ENCOUNTER
Patient stated she missed follow up call from Endoscopy office regarding colonoscopy  She reports that she had unusual cramping for 12 hours, abdominal pain, and diarrhea  Moderate to severe discomfort  She is fine today  Please call at your earliest convenience  Thank you

## 2023-03-01 PROCEDURE — 88305 TISSUE EXAM BY PATHOLOGIST: CPT | Performed by: PATHOLOGY

## 2023-08-28 ENCOUNTER — OFFICE VISIT (OUTPATIENT)
Dept: SURGERY | Facility: CLINIC | Age: 68
End: 2023-08-28
Payer: MEDICARE

## 2023-08-28 VITALS — BODY MASS INDEX: 23.32 KG/M2 | WEIGHT: 140 LBS | HEIGHT: 65 IN

## 2023-08-28 DIAGNOSIS — N64.4 MASTALGIA: Primary | ICD-10-CM

## 2023-08-28 PROCEDURE — 99203 OFFICE O/P NEW LOW 30 MIN: CPT | Performed by: SURGERY

## 2023-08-28 NOTE — PROGRESS NOTES
Assessment/Plan: Patient has a history of excision of a left axillary lipoma associated with nerve irritation at age 24. At the time she had a radicular pain over the triceps. This dissipated and resolved. She has been evaluated at breast health services over the last 3 years for discomfort in the tail of the breast.  Imaging with ultrasound and mammogram been unremarkable. She has noted a change in the skin crease and has developed dimpling within the left axilla. She is requested evaluation. Examination reveals a well-healed incision from her previous surgery. Inferior to this region is an area of discomfort when palpated in the anterior to posterior direction. It is not too tender when palpated against her chest wall. No particular masses associated on exam.  This may correlate with the tail of the breast.  Dimpling is also noted of the skin superior to her previous incision. This is noted when her arm is down at her side. It is approximately 1.5 cm in length. There is no evidence for adenopathy on exam.  No dominant masses are noted. I recommended observation over this region. Patient is agreeable. A follow-up visit in 6 months is suggested. Ultrasound imaging will be added to her yearly March mammogram testing. All questions answered. There are no diagnoses linked to this encounter. Subjective:      Patient ID: Kelly Maldonado is a 70 y.o. female. Patient presents for evaluation of a left axillary lump. States she has had the lump for 3 years. There is dimpling under the lump and she has intermittent pain. Denies size change. History of lipomas. Ultrasound left breast limited 3/6/2023  ULTRASOUND FINDINGS:   Real-time directed ultrasound of the left breast was performed.    Targeted ultrasound was performed in the area of palpable concern in the   left axilla located at 1:00, 13 cm from the nipple.  A normal-appearing   lymph node is present in this region. Dominick Sharpe is no suspicious mass. Areas of additional clinical concern in the left breast located at 11:00, 4   to 5 cm from nipple and 1-2 o'clock, 3 to 6 cm from the nipple were also scanned. [place] is no sonographic abnormality in these regions. 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 She  has a past surgical history that includes Sinus surgery; Bilateral oophorectomy; Hand tendon surgery; [place] tooth extraction; Cataract extraction, bilateral; Colonoscopy; EGD; Hysterectomy; Hemorrhoid surgery (3 times); Tubal ligation (1985); Upper gastrointestinal endoscopy; Colonoscopy; Shoulder surgery (Right); Tonsillectomy; and Breast surgery (1979). Her family history includes Allergies in her son; Alzheimer's disease in her mother; Arthritis in her father; COPD in her mother; Colon cancer in her maternal aunt, maternal grandmother, and paternal uncle; Heart disease in her mother; Hyperlipidemia in her brother, mother, and sister; Hypertension in her brother, mother, and sister; Hypothyroidism in her mother; No Known Problems in her daughter and daughter; Obesity in her sister; Other in her brother, brother, and sister; Psychiatric Illness in her brother; Seizures in her mother; Stroke in her mother. She  reports that she quit smoking about 43 years ago. Her smoking use included cigarettes. She has a 2.50 pack-year smoking history.  She has never used smokeless tobacco. She reports current alcohol use of about 2.0 standard drinks of alcohol per week. She reports that she does not use drugs. Current Outpatient Medications   Medication Sig Dispense Refill   • calcium carbonate (OS-COLBY) 600 MG tablet Take 600 mg by mouth 2 (two) times a day with meals W/vitamin D     • levocetirizine (XYZAL) 5 MG tablet Take 1 tablet (5 mg total) by mouth every evening 90 tablet 3   • Magnesium 250 MG TABS Take by mouth     • Mirabegron ER (Myrbetriq) 50 MG TB24 Myrbetriq 50 mg tablet,extended release     • Secukinumab (Cosentyx) 150 MG/ML SOSY Inject under the skin     • valACYclovir (VALTREX) 500 mg tablet Take 500 mg by mouth as needed     • verapamil (VERELAN PM) 120 MG 24 hr capsule Take 120 mg by mouth daily     • zinc gluconate 50 mg tablet Take 50 mg by mouth daily       No current facility-administered medications for this visit. She has No Known Allergies. .    Review of Systems   Constitutional: Negative. Negative for activity change. HENT: Negative. Eyes: Negative. Respiratory: Negative. Cardiovascular: Negative. Gastrointestinal: Negative. Endocrine: Negative. Genitourinary: Negative. Musculoskeletal: Negative. Allergic/Immunologic: Negative. Neurological: Negative. Psychiatric/Behavioral: Negative for agitation, behavioral problems and confusion. The patient is not nervous/anxious. Objective:      Ht 5' 5" (1.651 m)   Wt 63.5 kg (140 lb)   BMI 23.30 kg/m²          Physical Exam  Constitutional:       Appearance: Normal appearance. She is well-developed. HENT:      Head: Normocephalic and atraumatic. Nose: Nose normal.   Eyes:      Extraocular Movements: Extraocular movements intact. Conjunctiva/sclera: Conjunctivae normal.   Pulmonary:      Effort: Pulmonary effort is normal.   Chest:       Musculoskeletal:      Right lower leg: No edema. Left lower leg: No edema. Skin:     General: Skin is warm and dry.    Neurological:      Mental Status: She is alert and oriented to person, place, and time.    Psychiatric:         Mood and Affect: Mood normal.

## 2024-02-21 PROBLEM — J32.2 ETHMOID SINUSITIS: Status: RESOLVED | Noted: 2020-03-03 | Resolved: 2024-02-21

## 2024-02-26 ENCOUNTER — OFFICE VISIT (OUTPATIENT)
Dept: SURGERY | Facility: CLINIC | Age: 69
End: 2024-02-26
Payer: MEDICARE

## 2024-02-26 VITALS
HEIGHT: 66 IN | HEART RATE: 97 BPM | WEIGHT: 137 LBS | BODY MASS INDEX: 22.02 KG/M2 | SYSTOLIC BLOOD PRESSURE: 135 MMHG | DIASTOLIC BLOOD PRESSURE: 82 MMHG

## 2024-02-26 DIAGNOSIS — M79.622 AXILLARY PAIN, LEFT: Primary | ICD-10-CM

## 2024-02-26 PROCEDURE — 99212 OFFICE O/P EST SF 10 MIN: CPT | Performed by: SURGERY

## 2024-02-26 NOTE — PROGRESS NOTES
Assessment/Plan: Patient was seen for left axillary discomfort.  This has been present over the last 3 years.  Imaging is been unremarkable.  This is a follow-up exam.  She has noted a pinching sensation.  At times she needs to keep her arm abductor from the axilla secondary to discomfort.    Is notable that she has a history for left axillary lipoma excision at age 23.  This was associated with nerve irritation.      Examination reveals a well-healed incision from her previous surgery.  This area of scarring creates a dimpling effect.  Palpation over the axilla squeezing the tissue in the anterior to posterior plane causes discomfort.  There is minimal discomfort against the ribs.  There is no particular mass effect associated.  No evidence for adenopathy.      Reassurance provided.  I am not feeling a palpable mass for which there is intervention.  I postulate that it is related to the previous nerve irritation at age 23.  For completeness and updated ultrasound of the axilla as well as x-ray of the ribs is requested.  Patient is agreeable.    There are no diagnoses linked to this encounter.    Subjective:      Patient ID: Carrie Gatica is a 69 y.o. female.    Patient presents for follow up on left axillary lump.  States the lump has increased in size and she has more discomfort.      The following portions of the patient's history were reviewed and updated as appropriate:     She  has a past medical history of Abnormal ECG (2 months), Ankylosing spondylitis (HCC), Breast mass (1979), Cataract, Colon polyp (35), Deviated septum, Fever and chills, Fibrocystic breast (1990), Fissure, anal (1982), Gastric ulcer, Gastritis, GERD (gastroesophageal reflux disease), Hives, Lactose intolerance (1984), Parathyroid abnormality (HCC), PONV (postoperative nausea and vomiting) (1972), Seasonal allergies, SVT (supraventricular tachycardia) (HCC), and Weight loss.  She  has a past surgical history that includes Sinus  surgery; Bilateral oophorectomy; Hand tendon surgery; [place] tooth extraction; Cataract extraction, bilateral; Colonoscopy; EGD; Hysterectomy; Hemorrhoid surgery (3 times); Tubal ligation (1985); Upper gastrointestinal endoscopy; Colonoscopy; Shoulder surgery (Right); Tonsillectomy; and Breast surgery (1979).  Her family history includes Allergies in her son; Alzheimer's disease in her mother; Arthritis in her father; COPD in her mother; Colon cancer in her maternal aunt, maternal grandmother, and paternal uncle; Heart disease in her mother; Hyperlipidemia in her brother, mother, and sister; Hypertension in her brother, mother, and sister; Hypothyroidism in her mother; No Known Problems in her daughter and daughter; Obesity in her sister; Other in her brother, brother, and sister; Psychiatric Illness in her brother; Seizures in her mother; Stroke in her mother.  She  reports that she quit smoking about 44 years ago. Her smoking use included cigarettes. She started smoking about 49 years ago. She has a 2.5 pack-year smoking history. She has never used smokeless tobacco. She reports current alcohol use of about 2.0 standard drinks of alcohol per week. She reports that she does not use drugs.  Current Outpatient Medications   Medication Sig Dispense Refill   • calcium carbonate (OS-COLBY) 600 MG tablet Take 600 mg by mouth 2 (two) times a day with meals W/vitamin D     • levocetirizine (XYZAL) 5 MG tablet Take 1 tablet (5 mg total) by mouth every evening 90 tablet 3   • Magnesium 250 MG TABS Take by mouth     • Mirabegron ER (Myrbetriq) 50 MG TB24 Myrbetriq 50 mg tablet,extended release     • Secukinumab (Cosentyx) 150 MG/ML SOSY Inject under the skin     • valACYclovir (VALTREX) 500 mg tablet Take 500 mg by mouth as needed     • verapamil (VERELAN PM) 120 MG 24 hr capsule Take 120 mg by mouth daily     • zinc gluconate 50 mg tablet Take 50 mg by mouth daily       No current facility-administered medications for this  "visit.     She has No Known Allergies..    Review of Systems   Constitutional: Negative.  Negative for activity change.   HENT: Negative.     Eyes: Negative.    Respiratory: Negative.     Cardiovascular: Negative.    Gastrointestinal: Negative.    Endocrine: Negative.    Genitourinary: Negative.    Musculoskeletal: Negative.         Left axillary subcutaneous pain   Skin: Negative.    Allergic/Immunologic: Negative.    Neurological: Negative.    Psychiatric/Behavioral:  Negative for agitation, behavioral problems and confusion. The patient is not nervous/anxious.          Objective:      /82   Pulse 97   Ht 5' 6\" (1.676 m)   Wt 62.1 kg (137 lb)   BMI 22.11 kg/m²          Physical Exam  Constitutional:       Appearance: Normal appearance.   Eyes:      Extraocular Movements: Extraocular movements intact.   Pulmonary:      Effort: Pulmonary effort is normal.   Chest:       Skin:     General: Skin is warm and dry.       "

## 2024-03-15 ENCOUNTER — TELEPHONE (OUTPATIENT)
Age: 69
End: 2024-03-15

## 2024-04-03 PROBLEM — J45.909 ASTHMA: Status: ACTIVE | Noted: 2024-04-03

## 2024-05-23 PROBLEM — J32.9 RECURRENT SINUS INFECTIONS: Status: ACTIVE | Noted: 2024-05-23

## 2024-06-22 PROBLEM — J32.9 RECURRENT SINUS INFECTIONS: Status: RESOLVED | Noted: 2024-05-23 | Resolved: 2024-06-22

## 2025-03-17 ENCOUNTER — OFFICE VISIT (OUTPATIENT)
Dept: SURGERY | Facility: CLINIC | Age: 70
End: 2025-03-17
Payer: MEDICARE

## 2025-03-17 VITALS — WEIGHT: 127 LBS | HEIGHT: 66 IN | BODY MASS INDEX: 20.41 KG/M2

## 2025-03-17 DIAGNOSIS — M79.622 AXILLARY PAIN, LEFT: Primary | ICD-10-CM

## 2025-03-17 PROCEDURE — 99212 OFFICE O/P EST SF 10 MIN: CPT | Performed by: SURGERY

## 2025-03-17 NOTE — PROGRESS NOTES
"Name: Carrie Gatica      : 1955      MRN: [id number]  Encounter Provider: Clint Watson MD  Encounter Date: 3/17/2025   Encounter department: [facility]  :  Assessment & Plan  Axillary pain, left             History of Present Illness   HPI  Carrie Gatica is a 70 y.o. female who presents for evaluation of a left axillary lump.  States she has had the lump for 3 years.  She has pain with pressure.  She has noted a pinching sensation.  There is a history of a left axillary lipoma removal at age 23.  This was associated with nerve irritation.  Previous workup included ultrasound and imaging of the ribs.  These were normal.  Previous examination revealed no particular lipoma, but rather chest wall discomfort.    Examination reveals no dominant masses or adenopathy.  When I palpate in the AP projection, there is no discomfort.  When I palpate against the chest wall, she has sharp pain over approximately 3 ribs in the upper axilla.  This would be more consistent with chest wall discomfort rather than soft tissue discomfort.    Reassurance was provided.  I wonder if the rib and chest wall discomfort is relating to her ankylosing spondylitis history.  I asked her to  with her rheumatologist.  She is agreeable.  A second opinion was also offered, but patient has deferred.           Ultrasound breast axilla left 3/14/2024  Impression:   No suspicious sonographic findings in the left axilla.  Clinical follow-up   is recommended.  Return to annual screening mammography remains   recommended.         Review of Systems   Musculoskeletal:  Positive for back pain.          Objective   Ht 5' 5.5\" (1.664 m)   Wt 57.6 kg (127 lb)   BMI 20.81 kg/m²      Physical Exam  Constitutional:       Appearance: Normal appearance.   Cardiovascular:      Rate and Rhythm: Normal rate.   Pulmonary:      Effort: Pulmonary effort is normal.   Skin:     General: Skin is warm and dry.      " Comments: Examination of the left axilla reveals no dominant soft tissue mass.  There is no tenderness over the pectoralis or trapezius musculature.  There is tenderness over the ribs in the upper axilla to light palpation.   Neurological:      Mental Status: She is alert.